# Patient Record
Sex: FEMALE | Race: BLACK OR AFRICAN AMERICAN | NOT HISPANIC OR LATINO | Employment: OTHER | URBAN - METROPOLITAN AREA
[De-identification: names, ages, dates, MRNs, and addresses within clinical notes are randomized per-mention and may not be internally consistent; named-entity substitution may affect disease eponyms.]

---

## 2021-04-08 DIAGNOSIS — Z23 ENCOUNTER FOR IMMUNIZATION: ICD-10-CM

## 2022-02-28 ENCOUNTER — HOSPITAL ENCOUNTER (EMERGENCY)
Facility: HOSPITAL | Age: 72
Discharge: HOME | End: 2022-02-28
Attending: EMERGENCY MEDICINE | Admitting: EMERGENCY MEDICINE
Payer: COMMERCIAL

## 2022-02-28 VITALS
WEIGHT: 178.79 LBS | TEMPERATURE: 98.1 F | SYSTOLIC BLOOD PRESSURE: 142 MMHG | DIASTOLIC BLOOD PRESSURE: 86 MMHG | OXYGEN SATURATION: 96 % | RESPIRATION RATE: 20 BRPM | HEART RATE: 68 BPM

## 2022-02-28 DIAGNOSIS — F41.9 ANXIETY: ICD-10-CM

## 2022-02-28 DIAGNOSIS — D72.819 LEUKOPENIA, UNSPECIFIED TYPE: ICD-10-CM

## 2022-02-28 DIAGNOSIS — R00.2 PALPITATIONS: Primary | ICD-10-CM

## 2022-02-28 LAB
ALBUMIN SERPL BCP-MCNC: 3.7 G/DL (ref 3.5–5)
ALP SERPL-CCNC: 84 U/L (ref 46–116)
ALT SERPL W P-5'-P-CCNC: 40 U/L (ref 12–78)
ANION GAP SERPL CALCULATED.3IONS-SCNC: 9 MMOL/L (ref 4–13)
AST SERPL W P-5'-P-CCNC: 29 U/L (ref 5–45)
ATRIAL RATE: 72 BPM
BASOPHILS # BLD AUTO: 0.03 THOUSANDS/ΜL (ref 0–0.1)
BASOPHILS NFR BLD AUTO: 1 % (ref 0–1)
BILIRUB SERPL-MCNC: 1.13 MG/DL (ref 0.2–1)
BUN SERPL-MCNC: 12 MG/DL (ref 5–25)
CALCIUM SERPL-MCNC: 9.8 MG/DL (ref 8.3–10.1)
CARDIAC TROPONIN I PNL SERPL HS: 2 NG/L
CHLORIDE SERPL-SCNC: 101 MMOL/L (ref 100–108)
CO2 SERPL-SCNC: 25 MMOL/L (ref 21–32)
CREAT SERPL-MCNC: 0.7 MG/DL (ref 0.6–1.3)
EOSINOPHIL # BLD AUTO: 0.03 THOUSAND/ΜL (ref 0–0.61)
EOSINOPHIL NFR BLD AUTO: 1 % (ref 0–6)
ERYTHROCYTE [DISTWIDTH] IN BLOOD BY AUTOMATED COUNT: 14.9 % (ref 11.6–15.1)
GFR SERPL CREATININE-BSD FRML MDRD: 86 ML/MIN/1.73SQ M
GLUCOSE SERPL-MCNC: 86 MG/DL (ref 65–140)
HCT VFR BLD AUTO: 39.5 % (ref 34.8–46.1)
HGB BLD-MCNC: 12.8 G/DL (ref 11.5–15.4)
IMM GRANULOCYTES # BLD AUTO: 0.01 THOUSAND/UL (ref 0–0.2)
IMM GRANULOCYTES NFR BLD AUTO: 0 % (ref 0–2)
LYMPHOCYTES # BLD AUTO: 1.21 THOUSANDS/ΜL (ref 0.6–4.47)
LYMPHOCYTES NFR BLD AUTO: 32 % (ref 14–44)
MCH RBC QN AUTO: 28.4 PG (ref 26.8–34.3)
MCHC RBC AUTO-ENTMCNC: 32.4 G/DL (ref 31.4–37.4)
MCV RBC AUTO: 88 FL (ref 82–98)
MONOCYTES # BLD AUTO: 0.32 THOUSAND/ΜL (ref 0.17–1.22)
MONOCYTES NFR BLD AUTO: 9 % (ref 4–12)
NEUTROPHILS # BLD AUTO: 2.18 THOUSANDS/ΜL (ref 1.85–7.62)
NEUTS SEG NFR BLD AUTO: 57 % (ref 43–75)
NRBC BLD AUTO-RTO: 0 /100 WBCS
P AXIS: 59 DEGREES
PLATELET # BLD AUTO: 259 THOUSANDS/UL (ref 149–390)
PMV BLD AUTO: 9.3 FL (ref 8.9–12.7)
POTASSIUM SERPL-SCNC: 3.9 MMOL/L (ref 3.5–5.3)
PR INTERVAL: 142 MS
PROT SERPL-MCNC: 7.4 G/DL (ref 6.4–8.2)
QRS AXIS: 43 DEGREES
QRSD INTERVAL: 90 MS
QT INTERVAL: 380 MS
QTC INTERVAL: 416 MS
RBC # BLD AUTO: 4.51 MILLION/UL (ref 3.81–5.12)
SODIUM SERPL-SCNC: 135 MMOL/L (ref 136–145)
T WAVE AXIS: 24 DEGREES
VENTRICULAR RATE: 72 BPM
WBC # BLD AUTO: 3.78 THOUSAND/UL (ref 4.31–10.16)

## 2022-02-28 PROCEDURE — 99285 EMERGENCY DEPT VISIT HI MDM: CPT

## 2022-02-28 PROCEDURE — 93005 ELECTROCARDIOGRAM TRACING: CPT

## 2022-02-28 PROCEDURE — 80053 COMPREHEN METABOLIC PANEL: CPT | Performed by: EMERGENCY MEDICINE

## 2022-02-28 PROCEDURE — 84484 ASSAY OF TROPONIN QUANT: CPT | Performed by: EMERGENCY MEDICINE

## 2022-02-28 PROCEDURE — 85025 COMPLETE CBC W/AUTO DIFF WBC: CPT | Performed by: EMERGENCY MEDICINE

## 2022-02-28 PROCEDURE — 99284 EMERGENCY DEPT VISIT MOD MDM: CPT | Performed by: EMERGENCY MEDICINE

## 2022-02-28 PROCEDURE — 36415 COLL VENOUS BLD VENIPUNCTURE: CPT | Performed by: EMERGENCY MEDICINE

## 2022-02-28 PROCEDURE — 93010 ELECTROCARDIOGRAM REPORT: CPT | Performed by: INTERNAL MEDICINE

## 2022-02-28 NOTE — ED PROVIDER NOTES
Pt Name: Char Hollingsworth  MRN: [id number]  [place] 1950  Age/Sex: 66 y o  female  Date of evaluation: 2/28/2022  PCP: No primary care provider on file  CHIEF COMPLAINT    Chief Complaint   Patient presents with    Palpitations         HPI and MDM    66 y o  female presenting with palpitations  Patient states around 5-6 a m  This morning she took melatonin, she was feeling very stressed and then she had tachycardia  States she was very stressed and feels like she was having an anxiety attack  She has had this in the past   And it feels exactly the same  She was concerned that it may have been due to the medications but it did subside after an hour  Currently she is asymptomatic  Denies chest pain, nausea, vomiting, shortness of breath, recent illnesses  Patient appears well, vitals reassuring, currently asymptomatic  Doubt ACS, PE or acute aortic pathology  Obtain EKG, shows normal sinus rhythm heart rate of 72, normal axis, narrow QRS, intervals reassuring, no ST elevation, no significant ST depression  Mild nonspecific leukopenia, otherwise workup reassuring  Patient updated with results  Advised PCP follow-up  Return precautions discussed, she verbalized understanding and is in agreement with plan  Medications - No data to display      Past Medical and Surgical History    Past Medical History:   Diagnosis Date    Hypertension        Past Surgical History:   Procedure Laterality Date    BREAST SURGERY      mastectomy       History reviewed  No pertinent family history  Social History     Tobacco Use    Smoking status: Never Smoker    Smokeless tobacco: Never Used   Substance Use Topics    Alcohol use: Not Currently    Drug use: Never           Allergies    Not on File    Home Medications    Prior to Admission medications    Not on File           Review of Systems    Review of Systems   Constitutional: Negative for chills and fever     HENT: Negative for rhinorrhea and sore throat  Eyes: Negative for pain and visual disturbance  Respiratory: Negative for cough and shortness of breath  Cardiovascular: Positive for palpitations  Negative for chest pain and leg swelling  Gastrointestinal: Negative for abdominal pain, nausea and vomiting  Genitourinary: Negative for dysuria and hematuria  Musculoskeletal: Negative for back pain and myalgias  Skin: Negative for rash and wound  Neurological: Negative for syncope and headaches  Psychiatric/Behavioral: The patient is nervous/anxious  Physical Exam      ED Triage Vitals [02/28/22 0717]   Temperature Pulse Respirations Blood Pressure SpO2   98 1 °F (36 7 °C) 70 16 (!) 191/84 100 %      Temp src Heart Rate Source Patient Position - Orthostatic VS BP Location FiO2 (%)   -- -- -- -- --      Pain Score       --               Physical Exam  Constitutional:       General: She is not in acute distress  Appearance: She is not ill-appearing  HENT:      Head: Normocephalic and atraumatic  Nose: Nose normal    Eyes:      Extraocular Movements: Extraocular movements intact  Pupils: Pupils are equal, round, and reactive to light  Cardiovascular:      Rate and Rhythm: Normal rate and regular rhythm  Pulmonary:      Effort: Pulmonary effort is normal  No respiratory distress  Abdominal:      General: There is no distension  Musculoskeletal:         General: No swelling or deformity  Normal range of motion  Cervical back: Normal range of motion and neck supple  Skin:     General: Skin is warm  Findings: No erythema  Neurological:      Mental Status: She is alert and oriented to person, place, and time  Mental status is at baseline     Psychiatric:         Mood and Affect: Mood normal               Diagnostic Results      Labs:    Results Reviewed     Procedure Component Value Units Date/Time    HS Troponin I 2hr [[id number]]     Lab Status: No result Specimen: Blood     HS Troponin 0hr (reflex protocol) [[id number]]  (Normal) Collected: 02/28/22 0811    Lab Status: Final result Specimen: Blood from Arm, Right Updated: 02/28/22 0853     hs TnI 0hr 2 ng/L     Comprehensive metabolic panel [[id number]]  (Abnormal) Collected: 02/28/22 0811    Lab Status: Final result Specimen: Blood from Arm, Right Updated: 02/28/22 0831     Sodium 135 mmol/L      Potassium 3 9 mmol/L      Chloride 101 mmol/L      CO2 25 mmol/L      ANION GAP 9 mmol/L      BUN 12 mg/dL      Creatinine 0 70 mg/dL      Glucose 86 mg/dL      Calcium 9 8 mg/dL      AST 29 U/L      ALT 40 U/L      Alkaline Phosphatase 84 U/L      Total Protein 7 4 g/dL      Albumin 3 7 g/dL      Total Bilirubin 1 13 mg/dL      eGFR 86 ml/min/1 73sq m     Narrative:      [employer] guidelines for Chronic Kidney Disease (CKD):     Stage 1 with normal or high GFR (GFR > 90 mL/min/1 73 square meters)    Stage 2 Mild CKD (GFR = 60-89 mL/min/1 73 square meters)    Stage 3A Moderate CKD (GFR = 45-59 mL/min/1 73 square meters)    Stage 3B Moderate CKD (GFR = 30-44 mL/min/1 73 square meters)    Stage 4 Severe CKD (GFR = 15-29 mL/min/1 73 square meters)    Stage 5 End Stage CKD (GFR <15 mL/min/1 73 square meters)  Note: GFR calculation is accurate only with a steady state creatinine    CBC and differential [[id number]]  (Abnormal) Collected: 02/28/22 0811    Lab Status: Final result Specimen: Blood from Arm, Right Updated: 02/28/22 0816     WBC 3 78 Thousand/uL      RBC 4 51 Million/uL      Hemoglobin 12 8 g/dL      Hematocrit 39 5 %      MCV 88 fL      MCH 28 4 pg      MCHC 32 4 g/dL      RDW 14 9 %      MPV 9 3 fL      Platelets 651 Thousands/uL      nRBC 0 /100 WBCs      Neutrophils Relative 57 %      Immat GRANS % 0 %      Lymphocytes Relative 32 %      Monocytes Relative 9 %      Eosinophils Relative 1 %      Basophils Relative 1 %      Neutrophils Absolute 2 18 Thousands/µL      Immature Grans Absolute 0 01 Thousand/uL Lymphocytes Absolute 1 21 Thousands/µL      Monocytes Absolute 0 32 Thousand/µL      Eosinophils Absolute 0 03 Thousand/µL      Basophils Absolute 0 03 Thousands/µL           All labs reviewed and utilized in the medical decision making process    Radiology:    No orders to display       All radiology studies independently viewed by me and interpreted by the radiologist     Procedure    Procedures        FINAL IMPRESSION    Final diagnoses:   Palpitations   Anxiety   Leukopenia, unspecified type         DISPOSITION    Time reflects when diagnosis was documented in both MDM as applicable and the Disposition within this note     Time User Action Codes Description Comment    2/28/2022  9:03 AM Lavaun Monas Add [R00 2] Palpitations     2/28/2022  9:03 AM Lavaun Monas Add [F41 9] Anxiety     2/28/2022  9:05 AM Lavaun Monas Add [D72 819] Leukopenia, unspecified type       ED Disposition     ED Disposition Condition Date/Time Comment    Discharge Stable Mon Feb 28, 2022  9:06 AM Laurence Gillespie discharge to home/self care  Follow-up Information     Follow up With Specialties Details Why Luislorna Ren  Call  To establish a family doctor if needed [phone]              PATIENT REFERRED TO:    Jen Sender  [phone]    Call   To establish a family doctor if needed      DISCHARGE MEDICATIONS:    There are no discharge medications for this patient  No discharge procedures on file  Terrie Friedman DO        This note was partially completed using voice recognition technology, and was scanned for gross errors; however some errors may still exist  Please contact the author with any questions or requests for clarification        Terrie Friedman DO  02/28/22 1947

## 2022-02-28 NOTE — DISCHARGE INSTRUCTIONS
Please follow-up with your family doctor within 1 week  Your white blood cell count was noted to be slightly low, please follow this up with your family doctor

## 2022-06-25 ENCOUNTER — HOSPITAL ENCOUNTER (EMERGENCY)
Facility: HOSPITAL | Age: 72
Discharge: HOME | End: 2022-06-25
Attending: EMERGENCY MEDICINE | Admitting: EMERGENCY MEDICINE
Payer: COMMERCIAL

## 2022-06-25 VITALS
HEART RATE: 72 BPM | BODY MASS INDEX: 25.27 KG/M2 | WEIGHT: 161 LBS | RESPIRATION RATE: 21 BRPM | OXYGEN SATURATION: 100 % | TEMPERATURE: 97.5 F | SYSTOLIC BLOOD PRESSURE: 159 MMHG | HEIGHT: 67 IN | DIASTOLIC BLOOD PRESSURE: 74 MMHG

## 2022-06-25 DIAGNOSIS — R00.2 PALPITATIONS: Primary | ICD-10-CM

## 2022-06-25 LAB
ALBUMIN SERPL BCP-MCNC: 4.6 G/DL (ref 3.5–5)
ALP SERPL-CCNC: 102 U/L (ref 46–116)
ALT SERPL W P-5'-P-CCNC: 41 U/L (ref 12–78)
ANION GAP SERPL CALCULATED.3IONS-SCNC: 17 MMOL/L (ref 4–13)
AST SERPL W P-5'-P-CCNC: 40 U/L (ref 5–45)
BASOPHILS # BLD AUTO: 0.03 THOUSANDS/ΜL (ref 0–0.1)
BASOPHILS NFR BLD AUTO: 1 % (ref 0–1)
BILIRUB SERPL-MCNC: 1.08 MG/DL (ref 0.2–1)
BUN SERPL-MCNC: 12 MG/DL (ref 5–25)
CALCIUM SERPL-MCNC: 9.9 MG/DL (ref 8.3–10.1)
CARDIAC TROPONIN I PNL SERPL HS: 3 NG/L
CHLORIDE SERPL-SCNC: 96 MMOL/L (ref 100–108)
CO2 SERPL-SCNC: 21 MMOL/L (ref 21–32)
CREAT SERPL-MCNC: 0.76 MG/DL (ref 0.6–1.3)
EOSINOPHIL # BLD AUTO: 0.02 THOUSAND/ΜL (ref 0–0.61)
EOSINOPHIL NFR BLD AUTO: 0 % (ref 0–6)
ERYTHROCYTE [DISTWIDTH] IN BLOOD BY AUTOMATED COUNT: 14.6 % (ref 11.6–15.1)
GFR SERPL CREATININE-BSD FRML MDRD: 77 ML/MIN/1.73SQ M
GLUCOSE SERPL-MCNC: 87 MG/DL (ref 65–140)
HCT VFR BLD AUTO: 46.4 % (ref 34.8–46.1)
HGB BLD-MCNC: 14.3 G/DL (ref 11.5–15.4)
IMM GRANULOCYTES # BLD AUTO: 0.01 THOUSAND/UL (ref 0–0.2)
IMM GRANULOCYTES NFR BLD AUTO: 0 % (ref 0–2)
LYMPHOCYTES # BLD AUTO: 1.91 THOUSANDS/ΜL (ref 0.6–4.47)
LYMPHOCYTES NFR BLD AUTO: 29 % (ref 14–44)
MCH RBC QN AUTO: 29.1 PG (ref 26.8–34.3)
MCHC RBC AUTO-ENTMCNC: 30.8 G/DL (ref 31.4–37.4)
MCV RBC AUTO: 95 FL (ref 82–98)
MONOCYTES # BLD AUTO: 0.42 THOUSAND/ΜL (ref 0.17–1.22)
MONOCYTES NFR BLD AUTO: 6 % (ref 4–12)
NEUTROPHILS # BLD AUTO: 4.22 THOUSANDS/ΜL (ref 1.85–7.62)
NEUTS SEG NFR BLD AUTO: 64 % (ref 43–75)
NRBC BLD AUTO-RTO: 0 /100 WBCS
PLATELET # BLD AUTO: 269 THOUSANDS/UL (ref 149–390)
PMV BLD AUTO: 9.4 FL (ref 8.9–12.7)
POTASSIUM SERPL-SCNC: 3.5 MMOL/L (ref 3.5–5.3)
PROT SERPL-MCNC: 9 G/DL (ref 6.4–8.2)
RBC # BLD AUTO: 4.91 MILLION/UL (ref 3.81–5.12)
SODIUM SERPL-SCNC: 134 MMOL/L (ref 136–145)
WBC # BLD AUTO: 6.61 THOUSAND/UL (ref 4.31–10.16)

## 2022-06-25 PROCEDURE — 85025 COMPLETE CBC W/AUTO DIFF WBC: CPT | Performed by: EMERGENCY MEDICINE

## 2022-06-25 PROCEDURE — 84484 ASSAY OF TROPONIN QUANT: CPT | Performed by: EMERGENCY MEDICINE

## 2022-06-25 PROCEDURE — 80053 COMPREHEN METABOLIC PANEL: CPT | Performed by: EMERGENCY MEDICINE

## 2022-06-25 PROCEDURE — 93005 ELECTROCARDIOGRAM TRACING: CPT

## 2022-06-25 PROCEDURE — 99285 EMERGENCY DEPT VISIT HI MDM: CPT

## 2022-06-25 PROCEDURE — 99285 EMERGENCY DEPT VISIT HI MDM: CPT | Performed by: EMERGENCY MEDICINE

## 2022-06-25 PROCEDURE — 36415 COLL VENOUS BLD VENIPUNCTURE: CPT | Performed by: EMERGENCY MEDICINE

## 2022-06-25 NOTE — ED PROVIDER NOTES
History  Chief Complaint   Patient presents with    Palpitations     Pt c/o palpitations that started 1 hr ago      HPI  29-year-old female past medical history hypertension presents with palpitations  She states that they started when she woke up this morning  She states that she is undergoing a lot of stress recently in preparing for a move  She states that this feels the same as when she was seen in the ED for this a few months ago  She denies any chest pain  Denies shortness of breath, cough, sweating, nausea, abdominal pain  None       Past Medical History:   Diagnosis Date    Hypertension        Past Surgical History:   Procedure Laterality Date    BREAST SURGERY      mastectomy       History reviewed  No pertinent family history  I have reviewed and agree with the history as documented  E-Cigarette/Vaping     E-Cigarette/Vaping Substances     Social History     Tobacco Use    Smoking status: Never Smoker    Smokeless tobacco: Never Used   Substance Use Topics    Alcohol use: Not Currently    Drug use: Never       Review of Systems   Constitutional: Negative for chills and fever  HENT: Negative for dental problem and ear pain  Eyes: Negative for pain and redness  Respiratory: Negative for cough and shortness of breath  Cardiovascular: Positive for palpitations  Negative for chest pain  Gastrointestinal: Negative for abdominal pain and nausea  Endocrine: Negative for polydipsia and polyphagia  Genitourinary: Negative for dysuria and frequency  Musculoskeletal: Negative for arthralgias and joint swelling  Skin: Negative for color change and rash  Neurological: Negative for dizziness and headaches  Psychiatric/Behavioral: Negative for behavioral problems and confusion  All other systems reviewed and are negative  Physical Exam  Physical Exam  Vitals and nursing note reviewed  Constitutional:       General: She is not in acute distress       Appearance: She is well-developed  She is not diaphoretic  HENT:      Head: Atraumatic  Right Ear: External ear normal       Left Ear: External ear normal       Nose: Nose normal    Eyes:      Conjunctiva/sclera: Conjunctivae normal       Pupils: Pupils are equal, round, and reactive to light  Neck:      Vascular: No JVD  Cardiovascular:      Rate and Rhythm: Normal rate and regular rhythm  Heart sounds: Normal heart sounds  No murmur heard  Pulmonary:      Effort: Pulmonary effort is normal  No respiratory distress  Breath sounds: Normal breath sounds  No wheezing  Abdominal:      General: Bowel sounds are normal  There is no distension  Palpations: Abdomen is soft  Tenderness: There is no abdominal tenderness  Musculoskeletal:         General: Normal range of motion  Cervical back: Normal range of motion and neck supple  Skin:     General: Skin is warm and dry  Capillary Refill: Capillary refill takes less than 2 seconds  Neurological:      Mental Status: She is alert and oriented to person, place, and time  Cranial Nerves: No cranial nerve deficit     Psychiatric:         Behavior: Behavior normal          Vital Signs  ED Triage Vitals [06/25/22 0804]   Temperature Pulse Respirations Blood Pressure SpO2   97 5 °F (36 4 °C) 72 19 (!) 196/84 100 %      Temp Source Heart Rate Source Patient Position - Orthostatic VS BP Location FiO2 (%)   Oral Monitor Lying Right arm --      Pain Score       --           Vitals:    06/25/22 0804 06/25/22 0900   BP: (!) 196/84 159/74   Pulse: 72 72   Patient Position - Orthostatic VS: Lying          Visual Acuity      ED Medications  Medications - No data to display    Diagnostic Studies  Results Reviewed     Procedure Component Value Units Date/Time    HS Troponin I 2hr [[id number]]     Lab Status: No result Specimen: Blood     HS Troponin 0hr (reflex protocol) [[id number]]  (Normal) Collected: 06/25/22 3206    Lab Status: Final result Specimen: Blood from Arm, Right Updated: 06/25/22 0857     hs TnI 0hr 3 ng/L     Comprehensive metabolic panel [[id number]]  (Abnormal) Collected: 06/25/22 0822    Lab Status: Final result Specimen: Blood from Arm, Right Updated: 06/25/22 0850     Sodium 134 mmol/L      Potassium 3 5 mmol/L      Chloride 96 mmol/L      CO2 21 mmol/L      ANION GAP 17 mmol/L      BUN 12 mg/dL      Creatinine 0 76 mg/dL      Glucose 87 mg/dL      Calcium 9 9 mg/dL      AST 40 U/L      ALT 41 U/L      Alkaline Phosphatase 102 U/L      Total Protein 9 0 g/dL      Albumin 4 6 g/dL      Total Bilirubin 1 08 mg/dL      eGFR 77 ml/min/1 73sq m     Narrative:      [place] guidelines for Chronic Kidney Disease (CKD):     Stage 1 with normal or high GFR (GFR > 90 mL/min/1 73 square meters)    Stage 2 Mild CKD (GFR = 60-89 mL/min/1 73 square meters)    Stage 3A Moderate CKD (GFR = 45-59 mL/min/1 73 square meters)    Stage 3B Moderate CKD (GFR = 30-44 mL/min/1 73 square meters)    Stage 4 Severe CKD (GFR = 15-29 mL/min/1 73 square meters)    Stage 5 End Stage CKD (GFR <15 mL/min/1 73 square meters)  Note: GFR calculation is accurate only with a steady state creatinine    CBC and differential [[id number]]  (Abnormal) Collected: 06/25/22 0822    Lab Status: Final result Specimen: Blood from Arm, Right Updated: 06/25/22 0830     WBC 6 61 Thousand/uL      RBC 4 91 Million/uL      Hemoglobin 14 3 g/dL      Hematocrit 46 4 %      MCV 95 fL      MCH 29 1 pg      MCHC 30 8 g/dL      RDW 14 6 %      MPV 9 4 fL      Platelets 252 Thousands/uL      nRBC 0 /100 WBCs      Neutrophils Relative 64 %      Immat GRANS % 0 %      Lymphocytes Relative 29 %      Monocytes Relative 6 %      Eosinophils Relative 0 %      Basophils Relative 1 %      Neutrophils Absolute 4 22 Thousands/µL      Immature Grans Absolute 0 01 Thousand/uL      Lymphocytes Absolute 1 91 Thousands/µL      Monocytes Absolute 0 42 Thousand/µL      Eosinophils Absolute 0 02 Thousand/µL      Basophils Absolute 0 03 Thousands/µL                  No orders to display              Procedures  ECG 12 Lead Documentation Only    Date/Time: 6/25/2022 8:14 AM  Performed by: Kenji Holley MD  Authorized by: Kenji Holley MD     Comments:      NSR rate of 73 normal axis and intervals no acute ST elevations or depressions             ED Course                               SBIRT 22yo+    Flowsheet Row Most Recent Value   SBIRT (23 yo +)    In order to provide better care to our patients, we are screening all of our patients for alcohol and drug use  Would it be okay to ask you these screening questions? Yes Filed at: 06/25/2022 0825   Initial Alcohol Screen: US AUDIT-C     1  How often do you have a drink containing alcohol? 0 Filed at: 06/25/2022 0825   2  How many drinks containing alcohol do you have on a typical day you are drinking? 0 Filed at: 06/25/2022 0825   3a  Male UNDER 65: How often do you have five or more drinks on one occasion? 0 Filed at: 06/25/2022 0825   3b  FEMALE Any Age, or MALE 65+: How often do you have 4 or more drinks on one occassion? 0 Filed at: 06/25/2022 0825   Audit-C Score 0 Filed at: 06/25/2022 0825   [place]: How many times in the past year have you    Used an illegal drug or used a prescription medication for non-medical reasons? Never Filed at: 06/25/2022 0825                    MDM  Patient presents with palpitations consistent with prior episodes when she feels increased stress  Troponin negative, EKG not ischemic  NSR in ED on monitor  No chest pain or shortness of breath   Discussed cardiology follow up for recheck  Disposition  Final diagnoses:   Palpitations     Time reflects when diagnosis was documented in both MDM as applicable and the Disposition within this note     Time User Action Codes Description Comment    6/25/2022  9:14 AM [facility] Add [R00 2] Palpitations       ED Disposition     ED Disposition   Discharge    Condition   Stable Date/Time   Sat Jun 25, 2022  9:14 AM    Comment   Erik Gillespie discharge to home/self care  Follow-up Information     Follow up With Specialties Details Why Contact Info Additional [facility] Schedule an appointment as soon as possible for a visit   [address] [place] [phone]  [facility] [address], [address], [address] [place] [address], [phone]          Patient's Medications    No medications on file       No discharge procedures on file      PDMP Review     None          ED Provider  Electronically Signed by           Jyoti Sr MD  06/25/22 2708

## 2022-06-28 LAB
ATRIAL RATE: 73 BPM
P AXIS: 86 DEGREES
PR INTERVAL: 138 MS
QRS AXIS: 30 DEGREES
QRSD INTERVAL: 96 MS
QT INTERVAL: 396 MS
QTC INTERVAL: 436 MS
T WAVE AXIS: 32 DEGREES
VENTRICULAR RATE: 73 BPM

## 2022-06-28 PROCEDURE — 93010 ELECTROCARDIOGRAM REPORT: CPT | Performed by: INTERNAL MEDICINE
